# Patient Record
Sex: FEMALE | Race: WHITE | NOT HISPANIC OR LATINO | URBAN - METROPOLITAN AREA
[De-identification: names, ages, dates, MRNs, and addresses within clinical notes are randomized per-mention and may not be internally consistent; named-entity substitution may affect disease eponyms.]

---

## 2017-01-04 ENCOUNTER — ALLSCRIPTS OFFICE VISIT (OUTPATIENT)
Dept: OTHER | Facility: OTHER | Age: 27
End: 2017-01-04

## 2017-07-03 ENCOUNTER — ALLSCRIPTS OFFICE VISIT (OUTPATIENT)
Dept: OTHER | Facility: OTHER | Age: 27
End: 2017-07-03

## 2018-01-09 ENCOUNTER — GENERIC CONVERSION - ENCOUNTER (OUTPATIENT)
Dept: OTHER | Facility: OTHER | Age: 28
End: 2018-01-09

## 2018-01-14 VITALS
WEIGHT: 120 LBS | HEIGHT: 60 IN | RESPIRATION RATE: 14 BRPM | TEMPERATURE: 98.6 F | DIASTOLIC BLOOD PRESSURE: 78 MMHG | BODY MASS INDEX: 23.56 KG/M2 | SYSTOLIC BLOOD PRESSURE: 108 MMHG | HEART RATE: 120 BPM

## 2018-01-14 NOTE — PROGRESS NOTES
Assessment    1  Drinks caffeinated tea   2  Daily caffeinated coffee consumption   3  No alcohol use   4  Encounter for preventive health examination (V70 0) (Z00 00)   5  Flu vaccine need (V04 81) (Z23)   6  Vitamin D deficiency (268 9) (E55 9)    Plan  Anxiety    · Follow-up visit in 6 months Evaluation and Treatment  Follow-up  Status: Hold For -  Scheduling  Requested for: [id number]  Bipolar 1 disorder    · QUEtiapine Fumarate 200 MG Oral Tablet; TAKE 1 TABLET AT BEDTIME  Encounter for gynecological examination with Papanicolaou smear of cervix    · Gynecology Follow up Evaluation and Treatment  Follow-up  Status: Hold For -  Scheduling  Requested for: 08YPA2291  Flu vaccine need    · Fluzone Quadrivalent 0 5 ML Intramuscular Suspension  Health Maintenance    · Follow-up visit in 1 year Evaluation and Treatment  Follow-up  Status: Hold For -  Scheduling  Requested for: 22AHC6805   · Decreasing the stress in your life may help your condition improve ; Status:Complete;    Done: 47ECD9907   · Drink plenty of fluids ; Status:Complete;   Done: 43LRO3339   · There are ways to decrease your stress and improve your sense of well-being  We  encourage you to keep active and exercise regularly  Make time to take care of yourself  and participate in activities that you enjoy  Stay connected to friends and family that can  support and comfort you  If at any time you have thoughts of harming yourself or  someone else, contact us immediately ; Status:Active; Requested GUICHO:10MPN2251;    · We encourage all of our patients to exercise regularly  30 minutes of exercise or physical  activity five or more days a week is recommended for children and adults ;  Status:Complete;   Done: 95DZS4490   · We recommend regular contraceptive use to prevent an unplanned pregnancy ;  Status:Complete;   Done: 36LHN8322   · Call [phone] if:  You find a new or different kind of lump in your breast ;  Status:Complete;   Done: 22YHW5900   · Call [phone] if: Your feelings of being frightened, nervous, anxious, and out of  control are happening more often ; Status:Complete;   Done: 79YBQ5475    Discussion/Summary  health maintenance visit Currently, she eats an adequate diet and has an inadequate exercise regimen  the risks and benefits of cervical cancer screening were discussed overdue for pap  patient declines  will f/u with GYN Breast cancer screening: the risks and benefits of breast cancer screening were discussed and the patient declines breast cancer screening  Colorectal cancer screening: colorectal cancer screening is not indicated  Osteoporosis screening: bone mineral density testing is not indicated  The immunizations will be given as outlined in the orders  Advice and education were given regarding nutrition, aerobic exercise, vitamin D supplements and reproductive health  Patient discussion: discussed with the patient  Possible side effects of new medications were reviewed with the patient/guardian today  The treatment plan was reviewed with the patient/guardian  The patient/guardian understands and agrees with the treatment plan   The patient was counseled regarding diagnostic results, instructions for management, risk factor reductions, impressions, risks and benefits of treatment options, importance of compliance with treatment  Chief Complaint  PE  ck/lpn      History of Present Illness  HM, Adult Female: The patient is being seen for a health maintenance evaluation  The last health maintenance visit was unsure  General Health: The patient's health since the last visit is described as good  She has regular dental visits  She denies vision problems  She denies hearing loss  Immunizations status: not up to date The patient needs the following immunization(s): influenza vaccine  wears glasses  Lifestyle:  She consumes a diverse and healthy diet  She does not have any weight concerns  She exercises regularly  She does not use tobacco  She denies alcohol use  She denies drug use  Reproductive health: the patient is perimenopausal   she reports normal menses  she uses contraception  Screening: cancer screening reviewed and current  Cervical cancer screening includes a pap smear performed 2013  Breast cancer screening includes no previous mammogram  She hasn't been previously screened for colorectal cancer  metabolic screening reviewed and current  Metabolic screening includes lipid profile performed within the past five years, glucose screening performed last year, thyroid function test performed last year and no previous DEXA  risk screening reviewed and current  Cardiovascular risk factors: stress, but no hypertension, no obesity and no sedentary lifestyle  Safety elements used: seat belt and safe driving habits  Risk assessments performed include depression symptoms  Risk findings: no depression symptoms  Review of Systems    Constitutional: No fever, no chills, feels well, no tiredness, no recent weight gain or weight loss  Eyes: No complaints of eye pain, no red eyes, no eyesight problems, no discharge, no dry eyes, no itching of eyes  ENT: no complaints of earache, no loss of hearing, no nose bleeds, no nasal discharge, no sore throat, no hoarseness  Cardiovascular: No complaints of slow heart rate, no fast heart rate, no chest pain, no palpitations, no leg claudication, no lower extremity edema  Respiratory: No complaints of shortness of breath, no wheezing, no cough, no SOB on exertion, no orthopnea, no PND  Gastrointestinal: No complaints of abdominal pain, no constipation, no nausea or vomiting, no diarrhea, no bloody stools  Genitourinary: No complaints of dysuria, no incontinence, no pelvic pain, no dysmenorrhea, no vaginal discharge or bleeding  Musculoskeletal: No complaints of arthralgias, no myalgias, no joint swelling or stiffness, no limb pain or swelling  Integumentary: No complaints of skin rash or lesions, no itching, no skin wounds, no breast pain or lump  Neurological: No complaints of headache, no confusion, no convulsions, no numbness, no dizziness or fainting, no tingling, no limb weakness, no difficulty walking  Psychiatric: Not suicidal, no sleep disturbance, no anxiety or depression, no change in personality, no emotional problems  Endocrine: No complaints of proptosis, no hot flashes, no muscle weakness, no deepening of the voice, no feelings of weakness  Hematologic/Lymphatic: No complaints of swollen glands, no swollen glands in the neck, does not bleed easily, does not bruise easily  Active Problems    1  Anxiety (300 00) (F41 9)   2  Bipolar 1 disorder (296 7) (F31 9)   3  Encounter for lipid screening for cardiovascular disease (V77 91,V81 2) (Z13 220,Z13 6)   4  Encounter for long-term current use of high risk medication (V58 69) (Z79 899)   5  Vitamin D deficiency (268 9) (E55 9)    Past Medical History    · History of Acute URI (465 9) (J06 9)   · History of Fatigue (780 79) (R53 83)   · History of Fever (780 60) (R50 9)   · History of Hematuria (599 70) (R31 9)   · History of acute sinusitis (V12 69) (Z87 09)   · History of urticaria (V13 3) (Z87 2)   · History of Urticaria (708 9) (L50 9)   · History of UTI (urinary tract infection) (599 0) (N39 0)   · History of Viral gastroenteritis (008 8) (A08 4)    Social History    · Daily caffeinated coffee consumption   · Drinks caffeinated tea   · Former smoker (V15 82) (S79 040)   · No alcohol use    Current Meds   1  QUEtiapine Fumarate 200 MG Oral Tablet; TAKE 1 TABLET AT BEDTIME  Requested for:   68TVC1862; Last Rx:03Oct2016 Ordered    Allergies    1  Amoxicillin TABS   2  Ceclor CAPS   3   Zithromax Z-Benjamín TABS    Vitals   Recorded: [id number] 09:06AM   Temperature 98 9 F, Tympanic   Heart Rate 96, L Radial   Pulse Quality Regular, L Radial   Respiration Quality Normal   Respiration 14 Systolic 910, LUE, Sitting   Diastolic 90, LUE, Sitting   Height 4 ft 11 5 in   Weight 128 lb    BMI Calculated 25 42   BSA Calculated 1 53     Physical Exam    Constitutional   General appearance: No acute distress, well appearing and well nourished  Head and Face   Head and face: Normal     Eyes   Conjunctiva and lids: No swelling, erythema or discharge  Pupils and irises: Equal, round, reactive to light  Ears, Nose, Mouth, and Throat   External inspection of ears and nose: Normal     Otoscopic examination: Tympanic membranes translucent with normal light reflex  Canals patent without erythema  Hearing: Normal     Nasal mucosa, septum, and turbinates: Normal without edema or erythema  Lips, teeth, and gums: Normal, good dentition  Oropharynx: Normal with no erythema, edema, exudate or lesions  Neck   Neck: Supple, symmetric, trachea midline, no masses  Thyroid: Normal, no thyromegaly  Pulmonary   Respiratory effort: No increased work of breathing or signs of respiratory distress  Auscultation of lungs: Clear to auscultation  Cardiovascular   Auscultation of heart: Normal rate and rhythm, normal S1 and S2, no murmurs  Pedal pulses: 2+ bilaterally  Examination of extremities for edema and/or varicosities: Normal     Abdomen   Abdomen: Non-tender, no masses  Liver and spleen: No hepatomegaly or splenomegaly  Lymphatic   Palpation of lymph nodes in neck: No lymphadenopathy  Musculoskeletal   Gait and station: Normal     Digits and nails: Normal without clubbing or cyanosis  Joints, bones, and muscles: Normal     Range of motion: Normal     Stability: Normal     Muscle strength/tone: Normal     Skin   Skin and subcutaneous tissue: Normal without rashes or lesions  Neurologic   Cranial nerves: Cranial nerves II-XII intact  Psychiatric   Judgment and insight: Normal     Orientation to person, place, and time: Normal     Recent and remote memory: Intact      Mood and affect: Normal        Results/Data  PHQ-2 Adult Depression Screening [id number] 09:33AM Estela Reed     Test Name Result Flag Reference   PHQ-2 Adult Depression Score 0     Over the last two weeks, how often have you been bothered by any of the following problems?   Little interest or pleasure in doing things: Not at all - 0  Feeling down, depressed, or hopeless: Not at all - 0   PHQ-2 Adult Depression Screening Negative         Procedure    Procedure: Forgot glasses for eye exam      Future Appointments    Date/Time Provider Specialty Site   07/05/2017 09:45 AM [facility] [address]   Electronically signed by : MARISELA Redman; Jan 4 2017  9:36AM EST                       (Author)    Electronically signed by : RANDY Valdez ; Jan 4 2017 11:08AM EST                       (Author)

## 2018-01-15 NOTE — RESULT NOTES
Verified Results  (1) VITAMIN D 25-HYDROXY [id number] 11:55AM [facility]     Test Name Result Flag Reference   Vitamin D, 25-Hydroxy 17 0 ng/mL L 30 0-100 0   Vitamin D deficiency has been defined by the [address] practice guideline as a  level of serum 25-OH vitamin D less than 20 ng/mL (1,2)  The Endocrine Society went on to further define vitamin D  insufficiency as a level between 21 and 29 ng/mL (2)  1  IOM ([place] [employer])  2010  Dietary reference     intakes for calcium and D  [facility]: The     [employer]  2  Gracia MF, Isrrael PEARSON, Jonas MILLAN, et al      Evaluation, treatment, and prevention of vitamin D     deficiency: an Endocrine Society clinical practice     guideline  JCEM  2011 Jul; 96(7):1911-30  (1) LIPID PANEL, FASTING [id number] 11:55AM [facility]     Test Name Result Flag Reference   Cholesterol, Total 146 mg/dL  100-199   Triglycerides 61 mg/dL  0-149   HDL Cholesterol 62 mg/dL  >39   VLDL Cholesterol Phil 12 mg/dL  5-40   LDL Cholesterol Calc 72 mg/dL  0-99   T  Chol/HDL Ratio 2 4 ratio units  0 0-4 4   T  Chol/HDL Ratio                                                             Men  Women                                               1/2 Avg  Risk  3 4    3 3                                                   Avg Risk  5 0    4 4                                                2X Avg  Risk  9 6    7 1                                                3X Avg  Risk 23 4   11 0     (1) TSH WITH FT4 REFLEX [id number] 11:55AM [facility]     Test Name Result Flag Reference   TSH 1 560 uIU/mL  0 450-4 500     (1) COMPREHENSIVE METABOLIC PANEL [id number] 02:20DD [facility]     Test Name Result Flag Reference   Glucose, Serum 92 mg/dL  65-99   BUN 11 mg/dL  6-20   Creatinine, Serum 0 74 mg/dL  0 57-1 00   eGFR If NonAfricn Am 114 mL/min/1 73  >59   eGFR If Africn Am 131 mL/min/1 73  >59   BUN/Creatinine Ratio 15 8-20   Sodium, Serum 140 mmol/L  136-144   **Effective December 12, 2016 the reference interval**                   for Sodium, Serum will be changing to:                                                             134 - 144   Potassium, Serum 3 7 mmol/L  3 5-5 2   Chloride, Serum 101 mmol/L     **Effective December 12, 2016 the reference interval**                   for Chloride, Serum will be changing to:                                                              96 - 106   Carbon Dioxide, Total 23 mmol/L  18-29   Calcium, Serum 9 2 mg/dL  8 7-10 2   Protein, Total, Serum 6 7 g/dL  6 0-8 5   Albumin, Serum 4 2 g/dL  3 5-5 5   Globulin, Total 2 5 g/dL  1 5-4 5   A/G Ratio 1 7  1 1-2 5   Bilirubin, Total 0 4 mg/dL  0 0-1 2   Alkaline Phosphatase, S 65 IU/L     AST (SGOT) 19 IU/L  0-40   ALT (SGPT) 11 IU/L  0-32     (1) CBC/ PLT (NO DIFF) [id number] 11:55AM Candice Cheeks     Test Name Result Flag Reference   WBC 6 1 x10E3/uL  3 4-10 8   RBC 4 74 x10E6/uL  3 77-5 28   Hemoglobin 13 6 g/dL  11 1-15 9   Hematocrit 41 4 %  34 0-46  6   MCV 87 fL  79-97   MCH 28 7 pg  26 6-33 0   MCHC 32 9 g/dL  31 5-35 7   RDW 13 6 %  12 3-15 4   Platelets 441 C32I6/IU  150-379     (LC) Cardiovascular Risk Assessment [id number] 11:55AM [facility]     Test Name Result Flag Reference   Interpretation Note     -------------------------------  CARDIOVASCULAR REPORT:  -------------------------------  Current available clinical information suggests the  patient's risk is at least LOW  If the patient has two or  more major risk factors, the risk category is intermediate  If the patient has CHD or a CHD risk equivalent, the risk  category is high  If patient does not have CHD or a CHD risk  equivalent, consider use of the Pooled Cohort Equations to  estimate 10-year CVD risk, as individuals with greater than  7 5% risk may warrant more intensive therapy   The calculator  can be found at:  http://tools  cardiosource org/WKRPC-Guqh-Pbenqhbvv/  -  Insulin resistance, obesity, excessive alcohol use, smoking,  thyroid disease, nephrotic syndrome, liver disease, and  certain medications are all causes of secondary  dyslipidemia  Consider evaluation if clinically indicated  -  Therapeutic lifestyle changes are always valuable to achieve  optimal blood lipid status (diet, exercise, weight  management)  -------------------------------  LIPID MANAGEMENT  Select one patient risk category based upon medical history  and clinical judgment  Additional risk factors such as  personal or family history of premature CHD, smoking, and  hypertension modify a patient's goals of therapy  In CVD  prevention, the intensity of therapy should be adjusted to  the level of patient risk  MODERATE intensity statin therapy  generally results in an average LDL-C reduction of 30% to  less than 50% from the untreated baseline  Examples include  (daily doses): atorvastatin 10-20 mg, rosuvastatin 5-10 mg,  simvastatin 20-40 mg, pravastatin 40-80 mg, lovastatin 40  mg  HIGH intensity statin therapy generally results in an  average LDL-C reduction of 50% or more from the untreated  baseline  Examples include (daily doses): atorvastatin 40-80  mg and rosuvastatin 20 mg   -------------------------------  LOW RISK ASSESSMENT AND TREATMENT SUGGESTIONS  -------------------------------  LDL-C is optimal, 72 mg/dL  Non-HDL Cholesterol is optimal,  84 mg/dL  -  Considerations for use of statin therapy include family  history of premature atherosclerotic disease, elevated  coronary artery calcium score, ankle-brachial index < 0 9,  elevated CRP, or elevated lifetime CVD risk   -------------------------------  INTERMEDIATE RISK ASSESSMENT AND TREATMENT SUGGESTIONS  -------------------------------  LDL-C is optimal, 72 mg/dL  Non-HDL Cholesterol is optimal,  84 mg/dL    -  Consider measurement of LDL particle number or Apo B to  adjudicate need for further LDL lowering therapy  Factors  that may influence statin use include family history of  premature atherosclerotic disease, elevated coronary artery  calcium score, ankle-brachial index < 0 9, elevated CRP, or  elevated lifetime CVD risk  If statin cannot be tolerated or  increased, alternatives include use of an intestinal agent  (ezetimibe or bile acid sequestrant) or niacin   -------------------------------  HIGH RISK ASSESSMENT AND TREATMENT SUGGESTIONS  -------------------------------  LDL-C is normal, 72 mg/dL  Non-HDL Cholesterol is optimal,  84 mg/dL  -  If at least a 50% LDL reduction from baseline has not been  achieved, begin or increase statin  Consider measurement of  LDL particle number or Apo B to adjudicate need for further  LDL lowering therapy  If statin cannot be tolerated or  increased, alternatives include use of an intestinal agent  (ezetimibe or bile acid sequestrant) or niacin   -------------------------------  DISCLAIMER  These assessments and treatment suggestions are provided as  a convenience in support of the physician-patient  relationship and are not intended to replace the physician's  clinical judgment  They are derived from the national  guidelines in addition to other evidence and expert opinion  The clinician should consider this information within the  context of clinical opinion and the individual patient  SEE GUIDANCE FOR CARDIOVASCULAR REPORT: National Heart,  Lung, and Blood [place]'s Third Report of the NCEP Expert  Panel on Detection, Evaluation and Treatment of High Blood  Cholesterol in Adults (ATP III) (2002  NIH publication  );  Marilyn et al  Diabetes Care 2008; 31(4):811-82;  Blossom et al  Clin Chem 2009; 55(3):407-419; Marilou Bridges et  al  2013 ACC/AHA guideline on the treatment of blood  cholesterol to reduce atherosclerotic cardiovascular risk in  adults: a report of the [employer] of  Cardiology/American Heart Association Task Force on Practice  Guidelines  Circulation 2241;987(PIOGW 2):S1? S45  PDF Image   Discussion/Summary   blood work overall good  only abnormal is vit D level=17   Should take multi-vit with vit d  daily     Signatures   Electronically signed by : MARISELA Hurley; Dec  1 2016  8:34AM EST                       (Author)

## 2018-01-22 VITALS
HEART RATE: 96 BPM | SYSTOLIC BLOOD PRESSURE: 132 MMHG | WEIGHT: 128 LBS | HEIGHT: 60 IN | BODY MASS INDEX: 25.13 KG/M2 | TEMPERATURE: 98.9 F | RESPIRATION RATE: 14 BRPM | DIASTOLIC BLOOD PRESSURE: 90 MMHG

## 2018-01-24 VITALS
HEART RATE: 84 BPM | BODY MASS INDEX: 23.95 KG/M2 | SYSTOLIC BLOOD PRESSURE: 104 MMHG | TEMPERATURE: 97.6 F | DIASTOLIC BLOOD PRESSURE: 64 MMHG | HEIGHT: 60 IN | RESPIRATION RATE: 12 BRPM | WEIGHT: 122 LBS